# Patient Record
Sex: FEMALE | Race: WHITE
[De-identification: names, ages, dates, MRNs, and addresses within clinical notes are randomized per-mention and may not be internally consistent; named-entity substitution may affect disease eponyms.]

---

## 2017-08-22 ENCOUNTER — HOSPITAL ENCOUNTER (OUTPATIENT)
Dept: HOSPITAL 17 - CLAB | Age: 57
End: 2017-08-22
Payer: COMMERCIAL

## 2017-08-22 DIAGNOSIS — I10: Primary | ICD-10-CM

## 2017-08-22 DIAGNOSIS — I50.9: ICD-10-CM

## 2017-08-22 LAB
ALP SERPL-CCNC: 54 U/L (ref 45–117)
ALT SERPL-CCNC: 15 U/L (ref 10–53)
ANION GAP SERPL CALC-SCNC: 7 MEQ/L (ref 5–15)
AST SERPL-CCNC: 11 U/L (ref 15–37)
BILIRUB SERPL-MCNC: 0.5 MG/DL (ref 0.2–1)
BUN SERPL-MCNC: 6 MG/DL (ref 7–18)
CHLORIDE SERPL-SCNC: 100 MEQ/L (ref 98–107)
ERYTHROCYTE [DISTWIDTH] IN BLOOD BY AUTOMATED COUNT: 15 % (ref 11.6–17.2)
GFR SERPLBLD BASED ON 1.73 SQ M-ARVRAT: 89 ML/MIN (ref 89–?)
HCO3 BLD-SCNC: 27.1 MEQ/L (ref 21–32)
HCT VFR BLD CALC: 36.4 % (ref 35–46)
MCH RBC QN AUTO: 22.3 PG (ref 27–34)
MCHC RBC AUTO-ENTMCNC: 32.5 % (ref 32–36)
MCV RBC AUTO: 68.5 FL (ref 80–100)
PLATELET # BLD: 376 TH/MM3 (ref 150–450)
POTASSIUM SERPL-SCNC: 4.2 MEQ/L (ref 3.5–5.1)
RBC # BLD AUTO: 5.31 MIL/MM3 (ref 4–5.3)
REVIEW FLAG: (no result)
SODIUM SERPL-SCNC: 134 MEQ/L (ref 136–145)
WBC # BLD AUTO: 8.7 TH/MM3 (ref 4–11)

## 2017-08-22 PROCEDURE — 36415 COLL VENOUS BLD VENIPUNCTURE: CPT

## 2017-08-22 PROCEDURE — 80053 COMPREHEN METABOLIC PANEL: CPT

## 2017-08-22 PROCEDURE — 85027 COMPLETE CBC AUTOMATED: CPT

## 2018-01-31 ENCOUNTER — HOSPITAL ENCOUNTER (EMERGENCY)
Dept: HOSPITAL 17 - NED | Age: 58
Discharge: LEFT BEFORE DISCHARGE | End: 2018-01-31
Payer: COMMERCIAL

## 2018-01-31 VITALS — BODY MASS INDEX: 20.81 KG/M2 | HEIGHT: 61 IN | WEIGHT: 110.23 LBS

## 2018-01-31 VITALS
OXYGEN SATURATION: 100 % | HEART RATE: 87 BPM | SYSTOLIC BLOOD PRESSURE: 136 MMHG | TEMPERATURE: 98.5 F | RESPIRATION RATE: 18 BRPM | DIASTOLIC BLOOD PRESSURE: 79 MMHG

## 2018-01-31 DIAGNOSIS — I11.0: Primary | ICD-10-CM

## 2018-01-31 DIAGNOSIS — Z87.891: ICD-10-CM

## 2018-01-31 DIAGNOSIS — Z76.0: ICD-10-CM

## 2018-01-31 DIAGNOSIS — I50.9: ICD-10-CM

## 2018-01-31 PROCEDURE — 99281 EMR DPT VST MAYX REQ PHY/QHP: CPT

## 2018-01-31 NOTE — PD
HPI


Chief Complaint:  Medication Refill Request


Time Seen by Provider:  15:11


Travel History


International Travel<30 days:  No


Contact w/Intl Traveler<30days:  No


Traveled to known affect area:  No





History of Present Illness


HPI


Patient is a 57-year-old female presenting to the emergency department for a 

refill of her blood pressure medications.  Patient states she was supposed to 

set up an appointment at the [facility] for medication refills but due to 

transportation issues she came to the emergency department instead.  Patient 

has no physical complaints.  She reports a history of hypertension.





PFS


Past Medical History


Arthritis:  Yes


Asthma:  No


Autoimmune Disease:  No


Heart Rhythm Problems:  No


Cancer:  No


Cardiovascular Problems:  Yes (CHF)


High Cholesterol:  No


Chemotherapy:  No


Chest Pain:  No


Cerebrovascular Accident:  No


Diabetes:  No


Endocrine:  No


Gastrointestinal Disorders:  Yes


GERD:  Yes


Genitourinary:  No


Headaches:  Yes (takes aspirin or Tylenol as needed for HA)


Hypertension:  Yes


Immune Disorder:  No


Kidney Stones:  No


Musculoskeletal:  Yes


Psychiatric:  No


Reproductive:  No


Migraines:  No


Radiation Therapy:  No


Renal Failure:  No


Seizures:  No


Sickle Cell Disease:  No


Thyroid Disease:  No





Past Surgical History


AICD:  No


Arteriovenous Shunt:  No


Insulin Pump:  No


Joint Replacement:  No


Pacemaker:  No





Social History


Alcohol Use:  Yes (occ.)


Tobacco Use:  No (quit 2 weeks ago)


Substance Use:  No





Allergies-Medications


(Allergen,Severity, Reaction):  


Coded Allergies:  


     acetaminophen (Unverified  Adverse Reaction, Unknown, vomiting, 8/22/17)


     codeine (Unverified  Adverse Reaction, Unknown, vomiting, 8/22/17)


Reported Meds & Prescriptions





Reported Meds & Active Scripts


Active


Lisinopril 20 Mg Tab 20 Mg PO BID


Amlodipine (Amlodipine Besylate) 5 Mg Tab 5 Mg PO BID


Furosemide 40 Mg Tab 40 Mg PO DAILY


Reported


Womens Daily Formula (Multiple Vitamins W/ Minerals) 1 Tab Tab 1 Tab PO DAILY


Potassium Gluconate 550 Mg Tab 2 Tab PO DAILY


Aspirin 81 Mg Chew 81 Mg CHEW DAILY








Review of Systems


Except as stated in HPI:  all other systems reviewed are Neg





Physical Exam


Narrative


GENERAL: Well-developed, well-nourished, alert female.  Presenting in no acute 

distress.


SKIN: Warm and dry.


HEAD: Normocephalic.


EYES: No scleral icterus. No injection or drainage. 


CARDIOVASCULAR: Regular rate


RESPIRATORY: No accessory muscle use.








Data


Data


Last Documented VS





Vital Signs








  Date Time  Temp Pulse Resp B/P (MAP) Pulse Ox O2 Delivery O2 Flow Rate FiO2


 


1/31/18 17:51        


 


1/31/18 15:04 98.5 87 18  100 Room Air  











MDM


Medical Decision Making


Medical Screen Exam Complete:  Yes


Emergency Medical Condition:  Yes


Interpretation(s)





Vital Signs








  Date Time  Temp Pulse Resp B/P (MAP) Pulse Ox O2 Delivery O2 Flow Rate FiO2


 


1/31/18 17:51        


 


1/31/18 15:04 98.5 87 18 136/79 (98) 100 Room Air  








Differential Diagnosis


Elevated blood pressure versus hypertensive urgency versus metabolic 

abnormality versus medication refill


Narrative Course


Patient is a 57-year-old female presenting for medication refill.  Her vital 

signs are stable.  Patient is awaiting bed placement.





Patient was called be placed in the bed, she was no longer found in the 

emergency department.  Patient left AMA.





Diagnosis





 Primary Impression:  


 Left against medical advice











Constance Herrera Jan 31, 2018 17:12